# Patient Record
Sex: MALE | Race: WHITE
[De-identification: names, ages, dates, MRNs, and addresses within clinical notes are randomized per-mention and may not be internally consistent; named-entity substitution may affect disease eponyms.]

---

## 2018-07-27 ENCOUNTER — HOSPITAL ENCOUNTER (EMERGENCY)
Dept: HOSPITAL 72 - EMR | Age: 28
Discharge: HOME | End: 2018-07-27
Payer: COMMERCIAL

## 2018-07-27 VITALS — SYSTOLIC BLOOD PRESSURE: 131 MMHG | DIASTOLIC BLOOD PRESSURE: 86 MMHG

## 2018-07-27 VITALS — WEIGHT: 160 LBS | BODY MASS INDEX: 23.7 KG/M2 | HEIGHT: 69 IN

## 2018-07-27 DIAGNOSIS — R00.2: Primary | ICD-10-CM

## 2018-07-27 LAB
ADD MANUAL DIFF: NO
ANION GAP SERPL CALC-SCNC: 8 MMOL/L (ref 5–15)
BASOPHILS NFR BLD AUTO: 1.1 % (ref 0–2)
BUN SERPL-MCNC: 10 MG/DL (ref 7–18)
CALCIUM SERPL-MCNC: 9.1 MG/DL (ref 8.5–10.1)
CHLORIDE SERPL-SCNC: 105 MMOL/L (ref 98–107)
CO2 SERPL-SCNC: 27 MMOL/L (ref 21–32)
CREAT SERPL-MCNC: 0.9 MG/DL (ref 0.55–1.3)
EOSINOPHIL NFR BLD AUTO: 0.7 % (ref 0–3)
ERYTHROCYTE [DISTWIDTH] IN BLOOD BY AUTOMATED COUNT: 10.6 % (ref 11.6–14.8)
HCT VFR BLD CALC: 45 % (ref 42–52)
HGB BLD-MCNC: 15.9 G/DL (ref 14.2–18)
LYMPHOCYTES NFR BLD AUTO: 27.6 % (ref 20–45)
MCV RBC AUTO: 91 FL (ref 80–99)
MONOCYTES NFR BLD AUTO: 11.1 % (ref 1–10)
NEUTROPHILS NFR BLD AUTO: 59.5 % (ref 45–75)
PLATELET # BLD: 257 K/UL (ref 150–450)
POTASSIUM SERPL-SCNC: 3.6 MMOL/L (ref 3.5–5.1)
RBC # BLD AUTO: 4.94 M/UL (ref 4.7–6.1)
SODIUM SERPL-SCNC: 140 MMOL/L (ref 136–145)
WBC # BLD AUTO: 6.7 K/UL (ref 4.8–10.8)

## 2018-07-27 PROCEDURE — 84443 ASSAY THYROID STIM HORMONE: CPT

## 2018-07-27 PROCEDURE — 36415 COLL VENOUS BLD VENIPUNCTURE: CPT

## 2018-07-27 PROCEDURE — 93005 ELECTROCARDIOGRAM TRACING: CPT

## 2018-07-27 PROCEDURE — 71045 X-RAY EXAM CHEST 1 VIEW: CPT

## 2018-07-27 PROCEDURE — 80048 BASIC METABOLIC PNL TOTAL CA: CPT

## 2018-07-27 PROCEDURE — 99284 EMERGENCY DEPT VISIT MOD MDM: CPT

## 2018-07-27 PROCEDURE — 83735 ASSAY OF MAGNESIUM: CPT

## 2018-07-27 PROCEDURE — 85025 COMPLETE CBC W/AUTO DIFF WBC: CPT

## 2018-07-27 NOTE — DIAGNOSTIC IMAGING REPORT
Indication: Pain

 

Technique: One view of the chest

 

Comparison: none

 

Findings: Lungs and pleural spaces are clear. Heart size is normal

 

Impression: No acute process

## 2018-07-27 NOTE — EMERGENCY ROOM REPORT
History of Present Illness


General


Chief Complaint:  Palpitations


Source:  Patient





Present Illness


HPI


27-year-old male with a history of ADHD and alopecia, no surgical history, 

reports that he's been having palpitations since last night.  He reports that 

he had a near syncopal episode in the recent past, and so for the past 3 days 

has been wearing a Holter monitor, he actually has to turn that monitored in 

today to Dr. Shun Moyer.  However since he had symptoms all last night, 

he decided to come in today to the ER.  He reports last night he felt both of 

his arms were going numb and his feet were tingling bilaterally, he did not 

syncopized, no refill near syncope again.  He never had any pain complaints 

such as chest pain, back pain abdominal pain neck pain or any pain at all.  He 

denies hemoptysis, recent travel, leg swelling, leg pain, recent illness such 

as vomiting, diarrhea, fevers, chills.  He reports that he used to take Adderall

, but has stopped that for the past 2 weeks.  He also reports that he takes 

Propecia, but no other medications or supplements.  As a drug use or heavy 

drinking recently, that does report 2-3 cigarettes per day smoking habit.


Allergies:  


Coded Allergies:  


     No Known Allergies (Unverified , 7/27/18)





Patient History


Past Medical History:  see triage record


Reviewed Nursing Documentation:  PMH: Agreed; PSxH: Agreed





Nursing Documentation-PMH


Past Medical History:  No Stated History





Review of Systems


All Other Systems:  negative except mentioned in HPI





Physical Exam





Vital Signs








  Date Time  Temp Pulse Resp B/P (MAP) Pulse Ox O2 Delivery O2 Flow Rate FiO2


 


7/27/18 07:15 97.9 64 18 131/86 96 Room Air  





 97.9       








Sp02 EP Interpretation:  reviewed, normal


General Appearance:  no apparent distress, alert, non-toxic


Head:  normocephalic


Eyes:  bilateral eye normal inspection, bilateral eye PERRL, bilateral eye EOMI


ENT:  normal ENT inspection, hearing grossly normal, normal pharynx, no 

angioedema, normal voice, moist mucus membranes


Neck:  normal inspection, full range of motion, supple, thyroid normal, supple/

symm/no masses


Respiratory:  chest non-tender, lungs clear, normal breath sounds, chest 

symmetrical, palpation of chest normal


Cardiovascular #1:  normal peripheral pulses, regular rate, rhythm


Cardiovascular #2:  2+ radial (R), 2+ radial (L), 2+ dorsalis pedis (R), 2+ 

dorsalis pedis (L)


Gastrointestinal:  normal inspection, non tender, soft, no mass, no guarding, 

no rebound


Rectal:  deferred


Genitourinary:  normal inspection, no CVA tenderness


Musculoskeletal:  back normal, gait/station normal, normal range of motion, non-

tender, no calf tenderness, Salo's Sign negative


Neurologic:  alert, responsive, CNs III-XII nml as tested, motor strength/tone 

normal, sensory intact, speech normal


Psychiatric:  judgement/insight normal, memory normal, mood/affect normal


Skin:  normal color, no rash, warm/dry, normal turgor


Lymphatic:  no adenopathy





Medical Decision Making


Diagnostic Impression:  


 Primary Impression:  


 Palpitations


ER Course


I do not suspect any severe illness, patient is actually having palpitation 

sensation with his EKG was performed, and it had normal sinus rhythm rate of 67

, and he has been on the monitor here with no events, heart rate in the 50s, 

which he reports is his normal resting heart rate.  His labs, including 

electrolytes, hgb, TSH, are all wnl.  He has an appointment with cardiology 

already, I'll discharge and have him follow up, his signs and symptoms are not 

consistent with aortic dissection or pulmonary embolism.  His mediastinal 

silhouette in wnl (<8cm), his B/L UE BP were measured as below, and there is no 

wide discrepancy (SBP >20 difference), no murmur of aortic regurgitation or any 

murmur for that matter.  He denied recent travel or DVT/PE symptomatology and 

lacks any risk factors.  Nor does he seem that he is having acute coronary 

syndrome type pathology, I do not suspect of spontaneous pneumothorax or 

cardiac tamponade on or any other serious etiologies based on the CXR and other 

ancillary studies.  I have attempted to contact the [employer] and the 

cardiologist's office to attempt to get his event monitor interrogated in case 

patient went into any malignant arrhythmias last night, but have not heard 

back.  Will dc and have patient f/u with his cardiologist as I deem him to be 

very low-risk for adverse outcome given all the reassuring work-up and the 

excellent close follow-up already in motion.








B/L BP was done:


LUE BP: 126/72


RUE BP: 117/67


EKG Diagnostic Results


EKG Time:  07:21


EP Interpretation:  No ST or T segment changes, no T-wave inversions, normal 

QTC of 426 ms, no


Rate:  normal


Rhythm:  NSR


ST Segments:  no acute changes





Rhythm Strip Diag. Results


Rhythm Strip Time:  07:37


EP Interpretation:  yes


Rate:  56


Rhythm:  NSR, no PVC's, no ectopy





Chest X-Ray Diagnostic Results


Chest X-Ray Diagnostic Results :  


   Chest X-Ray Ordered:  Yes


   # of Views/Limited/Complete:  1 View


   Indication:  Other - Palpitations


   EP Interpretation:  Yes


   Interpretation:  no consolidation, no effusion, no pneumothorax, no acute 

cardiopulmonary disease


   Impression:  No acute disease


   Electronically Signed by:  Kavin Rutherford MD





Last Vital Signs








  Date Time  Temp Pulse Resp B/P (MAP) Pulse Ox O2 Delivery O2 Flow Rate FiO2


 


7/27/18 07:15 97.9 64 18 131/86 96 Room Air  





 97.9       








Disposition:  HOME, SELF-CARE











KAVIN RUTHERFORD M.D Jul 27, 2018 07:39

## 2018-07-27 NOTE — CARDIOLOGY REPORT
--------------- APPROVED REPORT --------------





EKG Measurement

Heart [id number]

NC [id number]

[id number]

[id number]

[id number]





Normal sinus rhythm with sinus arrhythmia

Normal ECG

## 2018-08-05 ENCOUNTER — HOSPITAL ENCOUNTER (EMERGENCY)
Dept: HOSPITAL 72 - EMR | Age: 28
Discharge: LEFT BEFORE DISCHARGE | End: 2018-08-05
Payer: COMMERCIAL

## 2018-08-05 VITALS — SYSTOLIC BLOOD PRESSURE: 124 MMHG | DIASTOLIC BLOOD PRESSURE: 84 MMHG

## 2018-08-05 VITALS — WEIGHT: 160 LBS | BODY MASS INDEX: 23.7 KG/M2 | HEIGHT: 69 IN

## 2018-08-05 DIAGNOSIS — Z53.21: ICD-10-CM

## 2018-08-05 DIAGNOSIS — F41.0: Primary | ICD-10-CM

## 2018-08-05 PROCEDURE — 99281 EMR DPT VST MAYX REQ PHY/QHP: CPT

## 2018-08-05 NOTE — EMERGENCY ROOM REPORT
History of Present Illness


General


Chief Complaint:  General Complaint


Source:  Patient





Present Illness


Allergies:  


Coded Allergies:  


     No Known Allergies (Unverified , 7/27/18)





Nursing Documentation-[facility]


Past Medical History:  No History, Except For





Physical Exam





Vital Signs








  Date Time  Temp Pulse Resp B/P (MAP) Pulse Ox O2 Delivery O2 Flow Rate FiO2


 


8/5/18 13:48 98.0 94 16 124/84 96 Room Air  





 98.1       











Medical Decision Making


ER Course


patient left prior to being seen





Last Vital Signs








  Date Time  Temp Pulse Resp B/P (MAP) Pulse Ox O2 Delivery O2 Flow Rate FiO2


 


8/5/18 13:48 98.0 94 16 124/84 96 Room Air  





 98.1       








Disposition:  LEFT W/OUT BEING SEEN


Condition:  Unknown











José Antonio Diggs DO Aug 5, 2018 13:59